# Patient Record
Sex: MALE | Race: WHITE | NOT HISPANIC OR LATINO | Employment: FULL TIME | URBAN - METROPOLITAN AREA
[De-identification: names, ages, dates, MRNs, and addresses within clinical notes are randomized per-mention and may not be internally consistent; named-entity substitution may affect disease eponyms.]

---

## 2024-06-24 ENCOUNTER — HOSPITAL ENCOUNTER (EMERGENCY)
Facility: HOSPITAL | Age: 44
Discharge: HOME | End: 2024-06-24
Attending: STUDENT IN AN ORGANIZED HEALTH CARE EDUCATION/TRAINING PROGRAM

## 2024-06-24 VITALS
RESPIRATION RATE: 18 BRPM | OXYGEN SATURATION: 96 % | SYSTOLIC BLOOD PRESSURE: 145 MMHG | TEMPERATURE: 98.9 F | DIASTOLIC BLOOD PRESSURE: 82 MMHG | HEART RATE: 91 BPM | WEIGHT: 263.6 LBS

## 2024-06-24 DIAGNOSIS — L91.8 SKIN TAG: Primary | ICD-10-CM

## 2024-06-24 PROCEDURE — 99284 EMERGENCY DEPT VISIT MOD MDM: CPT | Performed by: STUDENT IN AN ORGANIZED HEALTH CARE EDUCATION/TRAINING PROGRAM

## 2024-06-24 PROCEDURE — 99282 EMERGENCY DEPT VISIT SF MDM: CPT

## 2024-06-24 RX ORDER — CEPHALEXIN 500 MG/1
500 CAPSULE ORAL EVERY 6 HOURS SCHEDULED
Qty: 20 CAPSULE | Refills: 0 | Status: SHIPPED | OUTPATIENT
Start: 2024-06-24 | End: 2024-06-29

## 2024-06-24 NOTE — DISCHARGE INSTRUCTIONS
As discussed please follow up with dermatology. Please call to confirm your appointment. Return to the ED for any new or concerning symptoms.

## 2024-06-25 ENCOUNTER — TELEPHONE (OUTPATIENT)
Age: 44
End: 2024-06-25

## 2024-06-25 NOTE — TELEPHONE ENCOUNTER
Patient has an ASAP referral for a skin tag on his upper back/ lower right shoulder area. Currently inflamed and very bothersome. Patient works in [email or web address] and is required to wear a harness that makes contact with the lesion which causes a sharp, shooting pain which is becoming a safety hazard because it causes him to jump while he has working with equipment in hand, [employer].

## 2024-06-25 NOTE — ED PROVIDER NOTES
History  Chief Complaint   Patient presents with    Wound Check     Pt states he has a lump on his right back, pain recently while wearing harness at work and pain when rubbing it against something, no drainage      Pt is a 44 YO M, no pertinent Pmhx, who presents to the ED for skin tag removal. Pt states he has had a skin tag on his back for a while. He wears a harness at work and lately it has been rubbing against it, causing discomfort. Questions if it is infected. He states he called his doctors office, and they recommended he come to the ED for removal. No other complaints or concerns.           None       No past medical history on file.    No past surgical history on file.    No family history on file.  I have reviewed and agree with the history as documented.    E-Cigarette/Vaping     E-Cigarette/Vaping Substances     Social History     Tobacco Use    Smoking status: Never    Smokeless tobacco: Never   Substance Use Topics    Alcohol use: Not Currently    Drug use: Never       Review of Systems   Skin:         Skin tag     All other systems reviewed and are negative.      Physical Exam  Physical Exam  Vitals and nursing note reviewed.   Constitutional:       General: He is not in acute distress.     Appearance: He is well-developed. He is not diaphoretic.   HENT:      Head: Normocephalic and atraumatic.      Right Ear: External ear normal.      Left Ear: External ear normal.      Nose: Nose normal.   Eyes:      General: Lids are normal. No scleral icterus.  Cardiovascular:      Rate and Rhythm: Normal rate and regular rhythm.   Pulmonary:      Effort: Pulmonary effort is normal. No respiratory distress.   Musculoskeletal:         General: No deformity. Normal range of motion.        Back:    Skin:     General: Skin is warm and dry.   Neurological:      General: No focal deficit present.      Mental Status: He is alert.   Psychiatric:         Mood and Affect: Mood normal.         Behavior: Behavior normal.  "        Vital Signs  ED Triage Vitals [06/24/24 1717]   Temperature Pulse Respirations Blood Pressure SpO2   98.9 °F (37.2 °C) 91 18 145/82 96 %      Temp Source Heart Rate Source Patient Position - Orthostatic VS BP Location FiO2 (%)   Temporal Monitor Sitting Right arm --      Pain Score       No Pain           Vitals:    06/24/24 1717   BP: 145/82   Pulse: 91   Patient Position - Orthostatic VS: Sitting         Visual Acuity      ED Medications  Medications - No data to display    Diagnostic Studies  Results Reviewed       None                   No orders to display              Procedures  Procedures         ED Course                                             Medical Decision Making  Patient is a 43 y.o. male who presents to the ED for skin tag removal, concerns of infection. Pt is non-toxic, well appearing. Vitals are stable. .    Differential includes but is not limited to: skin tag, cellulitis. Presentation not consistent with abscess, NSTI.     Plan: Explained to patient we do not routinely remove skin tags in the ED. Will refer to derm. Will also treat for possible cellulitis. RTED precautions discussed.                  Portions of the record may have been created with voice recognition software. Occasional wrong word or \"sound a like\" substitutions may have occurred due to the inherent limitations of voice recognition software. Read the chart carefully and recognize, using context, where substitutions have occurred.      Risk  Prescription drug management.             Disposition  Final diagnoses:   Skin tag     Time reflects when diagnosis was documented in both MDM as applicable and the Disposition within this note       Time User Action Codes Description Comment    6/24/2024  6:04 PM Donovan Negrete Add [L91.8] Skin tag           ED Disposition       ED Disposition   Discharge    Condition   Stable    Date/Time   Mon Jun 24, 2024  6:03 PM    Comment   Luis Yoon discharge to home/self " care.                   Follow-up Information       Follow up With Specialties Details Why Contact Info Additional Information    [employer]  Call in 1 day  [phone]       [facility] Emergency Medicine   [address] [id number] [phone] [facility], [address], [place], [address]    [facility] Schedule an appointment as soon as possible for a visit   [address] [id number] [phone] [facility], [address] [facility], [address], [address] [phone]            Discharge Medication List as of 6/24/2024  6:04 PM        START taking these medications    Details   cephalexin (KEFLEX) 500 mg capsule Take 1 capsule (500 mg total) by mouth every 6 (six) hours for 5 days, Starting Mon 6/24/2024, Until Sat 6/29/2024, Normal                 PDMP Review       None            ED Provider  Electronically Signed by             Donovan Negrete DO  06/26/24 1275